# Patient Record
Sex: FEMALE | Race: WHITE | NOT HISPANIC OR LATINO | Employment: FULL TIME | URBAN - METROPOLITAN AREA
[De-identification: names, ages, dates, MRNs, and addresses within clinical notes are randomized per-mention and may not be internally consistent; named-entity substitution may affect disease eponyms.]

---

## 2019-07-02 ENCOUNTER — HOSPITAL ENCOUNTER (OUTPATIENT)
Dept: MAMMOGRAPHY | Facility: CLINIC | Age: 64
Discharge: HOME | End: 2019-07-02
Attending: FAMILY MEDICINE | Admitting: FAMILY MEDICINE
Payer: COMMERCIAL

## 2019-07-02 DIAGNOSIS — Z12.31 VISIT FOR SCREENING MAMMOGRAM: ICD-10-CM

## 2019-07-02 PROCEDURE — 77063 BREAST TOMOSYNTHESIS BI: CPT

## 2022-08-30 ENCOUNTER — MEDICAL CORRESPONDENCE (OUTPATIENT)
Dept: HEALTH INFORMATION MANAGEMENT | Facility: CLINIC | Age: 67
End: 2022-08-30

## 2022-08-30 DIAGNOSIS — E78.5 HYPERLIPEMIA: Primary | ICD-10-CM

## 2022-09-06 ENCOUNTER — HOSPITAL ENCOUNTER (OUTPATIENT)
Dept: CARDIOLOGY | Facility: CLINIC | Age: 67
Discharge: HOME | End: 2022-09-06
Attending: FAMILY MEDICINE | Admitting: FAMILY MEDICINE

## 2022-09-06 DIAGNOSIS — E78.5 HYPERLIPEMIA: ICD-10-CM

## 2022-09-06 PROCEDURE — 75571 CT HRT W/O DYE W/CA TEST: CPT

## 2022-09-06 PROCEDURE — 75571 CT HRT W/O DYE W/CA TEST: CPT | Mod: 26 | Performed by: INTERNAL MEDICINE

## 2023-11-05 ENCOUNTER — HEALTH MAINTENANCE LETTER (OUTPATIENT)
Age: 68
End: 2023-11-05

## 2024-01-11 ENCOUNTER — MEDICAL CORRESPONDENCE (OUTPATIENT)
Dept: HEALTH INFORMATION MANAGEMENT | Facility: CLINIC | Age: 69
End: 2024-01-11
Payer: COMMERCIAL

## 2024-01-11 DIAGNOSIS — R07.89 CHEST DISCOMFORT: ICD-10-CM

## 2024-01-11 DIAGNOSIS — R06.02 SOB (SHORTNESS OF BREATH): Primary | ICD-10-CM

## 2024-01-18 ENCOUNTER — HOSPITAL ENCOUNTER (OUTPATIENT)
Dept: CARDIOLOGY | Facility: CLINIC | Age: 69
Discharge: HOME | End: 2024-01-18
Attending: PHYSICIAN ASSISTANT | Admitting: PHYSICIAN ASSISTANT
Payer: COMMERCIAL

## 2024-01-18 DIAGNOSIS — R07.89 CHEST DISCOMFORT: ICD-10-CM

## 2024-01-18 DIAGNOSIS — R06.02 SOB (SHORTNESS OF BREATH): ICD-10-CM

## 2024-01-18 PROCEDURE — 93018 CV STRESS TEST I&R ONLY: CPT | Performed by: INTERNAL MEDICINE

## 2024-01-18 PROCEDURE — 93321 DOPPLER ECHO F-UP/LMTD STD: CPT | Mod: 26 | Performed by: INTERNAL MEDICINE

## 2024-01-18 PROCEDURE — 93325 DOPPLER ECHO COLOR FLOW MAPG: CPT | Mod: TC

## 2024-01-18 PROCEDURE — 255N000002 HC RX 255 OP 636: Performed by: PHYSICIAN ASSISTANT

## 2024-01-18 PROCEDURE — 93350 STRESS TTE ONLY: CPT | Mod: 26 | Performed by: INTERNAL MEDICINE

## 2024-01-18 PROCEDURE — 93016 CV STRESS TEST SUPVJ ONLY: CPT | Performed by: INTERNAL MEDICINE

## 2024-01-18 PROCEDURE — 93325 DOPPLER ECHO COLOR FLOW MAPG: CPT | Mod: 26 | Performed by: INTERNAL MEDICINE

## 2024-01-18 RX ADMIN — HUMAN ALBUMIN MICROSPHERES AND PERFLUTREN 9 ML: 10; .22 INJECTION, SOLUTION INTRAVENOUS at 11:30

## 2024-02-14 ENCOUNTER — OFFICE VISIT (OUTPATIENT)
Dept: CARDIOLOGY | Facility: CLINIC | Age: 69
End: 2024-02-14
Payer: COMMERCIAL

## 2024-02-14 VITALS
BODY MASS INDEX: 19.37 KG/M2 | WEIGHT: 109.3 LBS | HEART RATE: 88 BPM | HEIGHT: 63 IN | DIASTOLIC BLOOD PRESSURE: 78 MMHG | SYSTOLIC BLOOD PRESSURE: 131 MMHG

## 2024-02-14 DIAGNOSIS — I10 ESSENTIAL HYPERTENSION: ICD-10-CM

## 2024-02-14 DIAGNOSIS — R07.9 CHEST PAIN, UNSPECIFIED TYPE: Primary | ICD-10-CM

## 2024-02-14 PROCEDURE — 99204 OFFICE O/P NEW MOD 45 MIN: CPT | Performed by: INTERNAL MEDICINE

## 2024-02-14 RX ORDER — OLMESARTAN MEDOXOMIL 20 MG/1
20 TABLET ORAL DAILY
COMMUNITY
Start: 2024-01-12

## 2024-02-14 NOTE — PROGRESS NOTES
CARDIOLOGY CLINIC CONSULTATION      REASON FOR CONSULT:   Chest pain    PRIMARY CARE PHYSICIAN:  Chloé Cameron        History of Present Illness   Lindsey Caballero is an extremely pleasant 68 year old female here as a new patient to discuss her recent chest pain symptoms.  I also see her mother in clinic.  Patient has a minimal past medical history significant only for hypertension.  She is a never smoker.    She reports that she is under a great deal of stress working full-time and caring for her mother who is in her 90s and still lives alone, and also was diagnosed with cancer in September 2023 and has numerous clinic appointments that she needs to be taken to.  Over the past month or so, she is generally began to feel a difficult to describe sensation primarily in her chest and neck.  She is not sure if this is just related to stress, or if there could be something more problematic going on.  Her blood pressure has also been rising as well.  She was started on olmesartan 20 mg daily and shows me a home blood pressure log with recent blood pressures quite good, averaging around 120/70.  She also has been tried on several anxiety medications but had reactions to these.  Her primary care physician ordered an exercise stress echo, which she had done on 1/18/2024, and this showed no ECG or echo evidence of ischemia, and no significant valvular disease.  In addition, back in September 2022 she had a coronary calcium scan which showed a calcium score of 0.  I do not see any recent lipids in our system or in care everywhere, but patient reports she had this done a couple of years ago.    In addition to the above, I also reviewed her most recent chemistry panel and CBC from 2014.      Assessment & Plan     Difficult to describe chest discomfort: Suspect primarily related to significant life stressors, although cannot exclude obstructive CAD despite above testing  Hypertension, currently well-controlled  Never smoker      It  was a pleasure to meet with Lindsey in clinic today.  We discussed her symptoms, which are difficult for her to characterize and therefore somewhat challenging to definitively diagnose.  She understandably is worried about them.  I explained that the exercise stress echo is somewhat reassuring, but unfortunately does not definitively rule out obstructive coronary disease.  This is generally less likely given her coronary calcium score of 0 in 2022, though we also discussed that this only looks at calcified plaque, and she theoretically could have significant stenosis due to noncalcified plaque (though again this is fairly unlikely).  Given the amount of stress that she is under and the degree of worry that the symptoms are causing her, I think that more definitive evaluation is appropriate.  I recommended a coronary CTA, and she is in agreement with this.  Based on the results of this we can decide if any additional testing is required.  In the meantime, I would continue her current olmesartan dose, which appears to be controlling her blood pressures well at home.        Follow-up: If coronary CTA is unremarkable, no routine cardiology follow-up is required, though we would be happy to see Lindsey back at any point with future questions or concerns.      Naif Figueroa MD  Interventional Cardiology  February 14, 2024        Medications   Current Outpatient Medications   Medication    Cholecalciferol (VITAMIN D3 PO)    ibuprofen (ADVIL,MOTRIN) 600 MG tablet    LEVOTHYROXINE SODIUM PO    olmesartan (BENICAR) 20 MG tablet    Rizatriptan Benzoate (MAXALT PO)    cyclobenzaprine (FLEXERIL) 10 MG tablet    HYDROcodone-acetaminophen 5-325 MG per tablet    methylPREDNISolone (MEDROL DOSEPAK) 4 MG tablet     No current facility-administered medications for this visit.     Allergies   Allergies   Allergen Reactions    Codeine Camsylate     Desvenlafaxine      HOT FLASHES, LIGHTHEADEDNESS    Sertraline      LIGHTHEADEDNESS          Physical Exam       BP: 131/78 Pulse: 88            Vital Signs with Ranges  Pulse:  [88] 88  BP: (131)/(78) 131/78  109 lbs 4.8 oz    Constitutional: Well-appearing, no acute distress  Respiratory: Normal respiratory effort, CTAB  Cardiovascular: RRR, no m/r/g.  JVP < 7 cm H2O.  There is no LE edema.  Normal carotid upstrokes, no carotid bruits.

## 2024-02-14 NOTE — LETTER
2/14/2024    Chloé Cameron MD  [address]  [place] [address]    RE: Lindsey Caballero       Dear Colleague,     I had the pleasure of seeing Lindsey Caballero in the [facility].  CARDIOLOGY CLINIC CONSULTATION      REASON FOR CONSULT:   Chest pain    PRIMARY CARE PHYSICIAN:  Chloé Cameron        History of Present Illness  Lindsey Caballero is an extremely pleasant 68 year old female here as a new patient to discuss her recent chest pain symptoms.  I also see her mother in clinic.  Patient has a minimal past medical history significant only for hypertension.  She is a never smoker.    She reports that she is under a great deal of stress working full-time and caring for her mother who is in her 90s and still lives alone, and also was diagnosed with cancer in September 2023 and has numerous clinic appointments that she needs to be taken to.  Over the past month or so, she is generally began to feel a difficult to describe sensation primarily in her chest and neck.  She is not sure if this is just related to stress, or if there could be something more problematic going on.  Her blood pressure has also been rising as well.  She was started on olmesartan 20 mg daily and shows me a home blood pressure log with recent blood pressures quite good, averaging around 120/70.  She also has been tried on several anxiety medications but had reactions to these.  Her primary care physician ordered an exercise stress echo, which she had done on 1/18/2024, and this showed no ECG or echo evidence of ischemia, and no significant valvular disease.  In addition, back in September 2022 she had a coronary calcium scan which showed a calcium score of 0.  I do not see any recent lipids in our system or in care everywhere, but patient reports she had this done a couple of years ago.    In addition to the above, I also reviewed her most recent chemistry panel and CBC from 2014.      Assessment & Plan    Difficult to describe  chest discomfort: Suspect primarily related to significant life stressors, although cannot exclude obstructive CAD despite above testing  Hypertension, currently well-controlled  Never smoker      It was a pleasure to meet with Lindsey in clinic today.  We discussed her symptoms, which are difficult for her to characterize and therefore somewhat challenging to definitively diagnose.  She understandably is worried about them.  I explained that the exercise stress echo is somewhat reassuring, but unfortunately does not definitively rule out obstructive coronary disease.  This is generally less likely given her coronary calcium score of 0 in 2022, though we also discussed that this only looks at calcified plaque, and she theoretically could have significant stenosis due to noncalcified plaque (though again this is fairly unlikely).  Given the amount of stress that she is under and the degree of worry that the symptoms are causing her, I think that more definitive evaluation is appropriate.  I recommended a coronary CTA, and she is in agreement with this.  Based on the results of this we can decide if any additional testing is required.  In the meantime, I would continue her current olmesartan dose, which appears to be controlling her blood pressures well at home.        Follow-up: If coronary CTA is unremarkable, no routine cardiology follow-up is required, though we would be happy to see Lindsey back at any point with future questions or concerns.      Naif Figueroa MD  Interventional Cardiology  February 14, 2024        Medications  Current Outpatient Medications   Medication    Cholecalciferol (VITAMIN D3 PO)    ibuprofen (ADVIL,MOTRIN) 600 MG tablet    LEVOTHYROXINE SODIUM PO    olmesartan (BENICAR) 20 MG tablet    Rizatriptan Benzoate (MAXALT PO)    cyclobenzaprine (FLEXERIL) 10 MG tablet    HYDROcodone-acetaminophen 5-325 MG per tablet    methylPREDNISolone (MEDROL DOSEPAK) 4 MG tablet     No current  facility-administered medications for this visit.     Allergies  Allergies   Allergen Reactions    Codeine Camsylate     Desvenlafaxine      HOT FLASHES, LIGHTHEADEDNESS    Sertraline      LIGHTHEADEDNESS         Physical Exam      BP: 131/78 Pulse: 88            Vital Signs with Ranges  Pulse:  [88] 88  BP: (131)/(78) 131/78  109 lbs 4.8 oz    Constitutional: Well-appearing, no acute distress  Respiratory: Normal respiratory effort, CTAB  Cardiovascular: RRR, no m/r/g.  JVP < 7 cm H2O.  There is no LE edema.  Normal carotid upstrokes, no carotid bruits.      Thank you for allowing me to participate in the care of your patient.      Sincerely,     Naif Figueroa MD     [facility]  cc:   Referred Self,

## 2024-02-26 ENCOUNTER — HOSPITAL ENCOUNTER (OUTPATIENT)
Dept: CARDIOLOGY | Facility: CLINIC | Age: 69
Discharge: HOME | End: 2024-02-26
Attending: INTERNAL MEDICINE | Admitting: INTERNAL MEDICINE
Payer: COMMERCIAL

## 2024-02-26 DIAGNOSIS — R07.9 CHEST PAIN, UNSPECIFIED TYPE: ICD-10-CM

## 2024-02-26 PROCEDURE — 75574 CT ANGIO HRT W/3D IMAGE: CPT

## 2024-02-26 PROCEDURE — 250N000009 HC RX 250: Performed by: INTERNAL MEDICINE

## 2024-02-26 PROCEDURE — 75574 CT ANGIO HRT W/3D IMAGE: CPT | Mod: 26 | Performed by: INTERNAL MEDICINE

## 2024-02-26 PROCEDURE — 250N000013 HC RX MED GY IP 250 OP 250 PS 637: Performed by: INTERNAL MEDICINE

## 2024-02-26 PROCEDURE — 250N000011 HC RX IP 250 OP 636: Performed by: INTERNAL MEDICINE

## 2024-02-26 RX ORDER — METOPROLOL TARTRATE 25 MG/1
25-100 TABLET, FILM COATED ORAL
Status: COMPLETED | OUTPATIENT
Start: 2024-02-26 | End: 2024-02-26

## 2024-02-26 RX ORDER — ACYCLOVIR 200 MG/1
0-1 CAPSULE ORAL
Status: DISCONTINUED | OUTPATIENT
Start: 2024-02-26 | End: 2024-02-27 | Stop reason: HOSPADM

## 2024-02-26 RX ORDER — METOPROLOL TARTRATE 1 MG/ML
5-15 INJECTION, SOLUTION INTRAVENOUS
Status: DISCONTINUED | OUTPATIENT
Start: 2024-02-26 | End: 2024-02-27 | Stop reason: HOSPADM

## 2024-02-26 RX ORDER — IOPAMIDOL 755 MG/ML
500 INJECTION, SOLUTION INTRAVASCULAR ONCE
Status: COMPLETED | OUTPATIENT
Start: 2024-02-26 | End: 2024-02-26

## 2024-02-26 RX ORDER — ONDANSETRON 2 MG/ML
4 INJECTION INTRAMUSCULAR; INTRAVENOUS
Status: DISCONTINUED | OUTPATIENT
Start: 2024-02-26 | End: 2024-02-27 | Stop reason: HOSPADM

## 2024-02-26 RX ORDER — IVABRADINE 5 MG/1
5-15 TABLET, FILM COATED ORAL
Status: COMPLETED | OUTPATIENT
Start: 2024-02-26 | End: 2024-02-26

## 2024-02-26 RX ORDER — NITROGLYCERIN 0.4 MG/1
0.4 TABLET SUBLINGUAL
Status: DISCONTINUED | OUTPATIENT
Start: 2024-02-26 | End: 2024-02-27 | Stop reason: HOSPADM

## 2024-02-26 RX ORDER — DIPHENHYDRAMINE HCL 25 MG
25 CAPSULE ORAL
Status: DISCONTINUED | OUTPATIENT
Start: 2024-02-26 | End: 2024-02-27 | Stop reason: HOSPADM

## 2024-02-26 RX ORDER — DILTIAZEM HYDROCHLORIDE 5 MG/ML
10-15 INJECTION INTRAVENOUS
Status: DISCONTINUED | OUTPATIENT
Start: 2024-02-26 | End: 2024-02-27 | Stop reason: HOSPADM

## 2024-02-26 RX ORDER — METHYLPREDNISOLONE SODIUM SUCCINATE 125 MG/2ML
125 INJECTION, POWDER, LYOPHILIZED, FOR SOLUTION INTRAMUSCULAR; INTRAVENOUS
Status: DISCONTINUED | OUTPATIENT
Start: 2024-02-26 | End: 2024-02-27 | Stop reason: HOSPADM

## 2024-02-26 RX ORDER — DIPHENHYDRAMINE HYDROCHLORIDE 50 MG/ML
25-50 INJECTION INTRAMUSCULAR; INTRAVENOUS
Status: DISCONTINUED | OUTPATIENT
Start: 2024-02-26 | End: 2024-02-27 | Stop reason: HOSPADM

## 2024-02-26 RX ORDER — DILTIAZEM HCL 60 MG
120 TABLET ORAL
Status: DISCONTINUED | OUTPATIENT
Start: 2024-02-26 | End: 2024-02-27 | Stop reason: HOSPADM

## 2024-02-26 RX ADMIN — METOPROLOL TARTRATE 5 MG: 5 INJECTION INTRAVENOUS at 10:31

## 2024-02-26 RX ADMIN — IOPAMIDOL 100 ML: 755 INJECTION, SOLUTION INTRAVENOUS at 10:49

## 2024-02-26 RX ADMIN — METOPROLOL TARTRATE 100 MG: 50 TABLET, FILM COATED ORAL at 09:01

## 2024-02-26 RX ADMIN — METOPROLOL TARTRATE 5 MG: 5 INJECTION INTRAVENOUS at 10:22

## 2024-02-26 RX ADMIN — IVABRADINE 15 MG: 5 TABLET, FILM COATED ORAL at 09:01

## 2024-02-26 RX ADMIN — NITROGLYCERIN 0.4 MG: 0.4 TABLET SUBLINGUAL at 10:37

## 2024-02-26 RX ADMIN — METOPROLOL TARTRATE 5 MG: 5 INJECTION INTRAVENOUS at 10:30

## 2024-02-26 RX ADMIN — METOPROLOL TARTRATE 5 MG: 5 INJECTION INTRAVENOUS at 10:32

## 2024-06-10 ENCOUNTER — APPOINTMENT (OUTPATIENT)
Dept: CT IMAGING | Facility: CLINIC | Age: 69
End: 2024-06-10
Attending: EMERGENCY MEDICINE
Payer: COMMERCIAL

## 2024-06-10 ENCOUNTER — HOSPITAL ENCOUNTER (EMERGENCY)
Facility: CLINIC | Age: 69
Discharge: HOME | End: 2024-06-10
Attending: EMERGENCY MEDICINE | Admitting: EMERGENCY MEDICINE
Payer: COMMERCIAL

## 2024-06-10 VITALS
WEIGHT: 108 LBS | HEIGHT: 63 IN | HEART RATE: 93 BPM | DIASTOLIC BLOOD PRESSURE: 89 MMHG | RESPIRATION RATE: 12 BRPM | OXYGEN SATURATION: 99 % | SYSTOLIC BLOOD PRESSURE: 148 MMHG | TEMPERATURE: 98.7 F | BODY MASS INDEX: 19.14 KG/M2

## 2024-06-10 DIAGNOSIS — I10 BENIGN ESSENTIAL HYPERTENSION: ICD-10-CM

## 2024-06-10 DIAGNOSIS — R53.83 FATIGUE, UNSPECIFIED TYPE: ICD-10-CM

## 2024-06-10 LAB
ALBUMIN SERPL BCG-MCNC: 4.4 G/DL (ref 3.5–5.2)
ALP SERPL-CCNC: 77 U/L (ref 40–150)
ALT SERPL W P-5'-P-CCNC: 35 U/L (ref 0–50)
ANION GAP SERPL CALCULATED.3IONS-SCNC: 12 MMOL/L (ref 7–15)
AST SERPL W P-5'-P-CCNC: 34 U/L (ref 0–45)
ATRIAL RATE - MUSE: 84 BPM
BASOPHILS # BLD AUTO: 0 10E3/UL (ref 0–0.2)
BASOPHILS NFR BLD AUTO: 1 %
BILIRUB SERPL-MCNC: 0.3 MG/DL
BUN SERPL-MCNC: 13.1 MG/DL (ref 8–23)
CALCIUM SERPL-MCNC: 9.3 MG/DL (ref 8.8–10.2)
CHLORIDE SERPL-SCNC: 95 MMOL/L (ref 98–107)
CREAT SERPL-MCNC: 0.71 MG/DL (ref 0.51–0.95)
DEPRECATED HCO3 PLAS-SCNC: 24 MMOL/L (ref 22–29)
DIASTOLIC BLOOD PRESSURE - MUSE: NORMAL MMHG
EGFRCR SERPLBLD CKD-EPI 2021: >90 ML/MIN/1.73M2
EOSINOPHIL # BLD AUTO: 0 10E3/UL (ref 0–0.7)
EOSINOPHIL NFR BLD AUTO: 1 %
ERYTHROCYTE [DISTWIDTH] IN BLOOD BY AUTOMATED COUNT: 11.9 % (ref 10–15)
GLUCOSE SERPL-MCNC: 105 MG/DL (ref 70–99)
HCT VFR BLD AUTO: 34.5 % (ref 35–47)
HGB BLD-MCNC: 11.8 G/DL (ref 11.7–15.7)
HOLD SPECIMEN: NORMAL
HOLD SPECIMEN: NORMAL
IMM GRANULOCYTES # BLD: 0 10E3/UL
IMM GRANULOCYTES NFR BLD: 0 %
INTERPRETATION ECG - MUSE: NORMAL
LYMPHOCYTES # BLD AUTO: 0.8 10E3/UL (ref 0.8–5.3)
LYMPHOCYTES NFR BLD AUTO: 11 %
MCH RBC QN AUTO: 29.8 PG (ref 26.5–33)
MCHC RBC AUTO-ENTMCNC: 34.2 G/DL (ref 31.5–36.5)
MCV RBC AUTO: 87 FL (ref 78–100)
MONOCYTES # BLD AUTO: 0.5 10E3/UL (ref 0–1.3)
MONOCYTES NFR BLD AUTO: 7 %
NEUTROPHILS # BLD AUTO: 5.8 10E3/UL (ref 1.6–8.3)
NEUTROPHILS NFR BLD AUTO: 80 %
NRBC # BLD AUTO: 0 10E3/UL
NRBC BLD AUTO-RTO: 0 /100
P AXIS - MUSE: 77 DEGREES
PLATELET # BLD AUTO: 267 10E3/UL (ref 150–450)
POTASSIUM SERPL-SCNC: 4.1 MMOL/L (ref 3.4–5.3)
PR INTERVAL - MUSE: 136 MS
PROT SERPL-MCNC: 7.4 G/DL (ref 6.4–8.3)
QRS DURATION - MUSE: 80 MS
QT - MUSE: 336 MS
QTC - MUSE: 397 MS
R AXIS - MUSE: 87 DEGREES
RBC # BLD AUTO: 3.96 10E6/UL (ref 3.8–5.2)
SODIUM SERPL-SCNC: 131 MMOL/L (ref 135–145)
SYSTOLIC BLOOD PRESSURE - MUSE: NORMAL MMHG
T AXIS - MUSE: 64 DEGREES
TROPONIN T SERPL HS-MCNC: 7 NG/L
TSH SERPL DL<=0.005 MIU/L-ACNC: 0.76 UIU/ML (ref 0.3–4.2)
VENTRICULAR RATE- MUSE: 84 BPM
WBC # BLD AUTO: 7.2 10E3/UL (ref 4–11)

## 2024-06-10 PROCEDURE — 84443 ASSAY THYROID STIM HORMONE: CPT | Performed by: EMERGENCY MEDICINE

## 2024-06-10 PROCEDURE — 99285 EMERGENCY DEPT VISIT HI MDM: CPT | Mod: 25

## 2024-06-10 PROCEDURE — 80053 COMPREHEN METABOLIC PANEL: CPT | Performed by: EMERGENCY MEDICINE

## 2024-06-10 PROCEDURE — 250N000013 HC RX MED GY IP 250 OP 250 PS 637: Performed by: EMERGENCY MEDICINE

## 2024-06-10 PROCEDURE — 36415 COLL VENOUS BLD VENIPUNCTURE: CPT | Performed by: EMERGENCY MEDICINE

## 2024-06-10 PROCEDURE — 84484 ASSAY OF TROPONIN QUANT: CPT | Performed by: EMERGENCY MEDICINE

## 2024-06-10 PROCEDURE — 70450 CT HEAD/BRAIN W/O DYE: CPT | Mod: XU

## 2024-06-10 PROCEDURE — 250N000009 HC RX 250: Performed by: EMERGENCY MEDICINE

## 2024-06-10 PROCEDURE — 93005 ELECTROCARDIOGRAM TRACING: CPT

## 2024-06-10 PROCEDURE — 250N000011 HC RX IP 250 OP 636: Performed by: EMERGENCY MEDICINE

## 2024-06-10 PROCEDURE — 70496 CT ANGIOGRAPHY HEAD: CPT

## 2024-06-10 PROCEDURE — 85004 AUTOMATED DIFF WBC COUNT: CPT | Performed by: EMERGENCY MEDICINE

## 2024-06-10 RX ORDER — IOPAMIDOL 755 MG/ML
67 INJECTION, SOLUTION INTRAVASCULAR ONCE
Status: COMPLETED | OUTPATIENT
Start: 2024-06-10 | End: 2024-06-10

## 2024-06-10 RX ORDER — ACETAMINOPHEN 500 MG
1000 TABLET ORAL ONCE
Status: COMPLETED | OUTPATIENT
Start: 2024-06-10 | End: 2024-06-10

## 2024-06-10 RX ADMIN — IOPAMIDOL 67 ML: 755 INJECTION, SOLUTION INTRAVENOUS at 15:54

## 2024-06-10 RX ADMIN — SODIUM CHLORIDE 100 ML: 9 INJECTION, SOLUTION INTRAVENOUS at 15:53

## 2024-06-10 RX ADMIN — ACETAMINOPHEN 1000 MG: 500 TABLET, FILM COATED ORAL at 17:25

## 2024-06-10 ASSESSMENT — ACTIVITIES OF DAILY LIVING (ADL)
ADLS_ACUITY_SCORE: 35

## 2024-06-10 ASSESSMENT — COLUMBIA-SUICIDE SEVERITY RATING SCALE - C-SSRS
1. IN THE PAST MONTH, HAVE YOU WISHED YOU WERE DEAD OR WISHED YOU COULD GO TO SLEEP AND NOT WAKE UP?: NO
6. HAVE YOU EVER DONE ANYTHING, STARTED TO DO ANYTHING, OR PREPARED TO DO ANYTHING TO END YOUR LIFE?: NO
2. HAVE YOU ACTUALLY HAD ANY THOUGHTS OF KILLING YOURSELF IN THE PAST MONTH?: NO

## 2024-06-10 NOTE — ED TRIAGE NOTES
Pt presents to ED for evaluation of dizziness/lightheadedness since this morning while at work. Pt reports high blood pressure readings at work. Of note, patient stopped taking blood pressure meds last week.      Triage Assessment (Adult)       Row Name 06/10/24 1317          Triage Assessment    Airway WDL WDL        Respiratory WDL    Respiratory WDL WDL        Skin Circulation/Temperature WDL    Skin Circulation/Temperature WDL WDL        Cardiac WDL    Cardiac WDL X  HTN and dizziness. denies CP        Peripheral/Neurovascular WDL    Peripheral Neurovascular WDL WDL        Cognitive/Neuro/Behavioral WDL    Cognitive/Neuro/Behavioral WDL WDL

## 2024-06-10 NOTE — ED PROVIDER NOTES
"  Emergency Department Note      History of Present Illness     Chief Complaint  Dizziness    HPI  Lindsey Caballero is a 69 year old female who presents to the ER for evaluation of headache that onset this morning. She initially attributed her symptoms to hypertension. She reports her headache is a 4/10 and describes a \"swarmy\" feeling and denies room-spinning or balance issues. She was experiencing hot and cold spells at work today. She endorses feeling like her blood sugar is low. She notes taking deeper breaths, but attributes it to anxiety. Patient describes waking early around 0500 the last few nights. She states that she stopped taking Olmesartan 6 days ago (6/4/24) without provider knowledge, that she had been prescribed in January. She reports that after she started taking her BP medicine, her BP tappered off to the 120s. She denies chest pain, nausea, vomiting, changes in urination, or changes in bowel movements. Patient notes not sleeping well. Patient describes a history of migraines but states that this headache is unlike any others. She has not taken medication to treat migraines for a while. No recent smoking or alcohol use.     Independent Historian  None    Review of External Notes  I reviewed medical records from: Cardiology office visit on 2/14/2024 for an overview of the patient's previous cardiac workup   Past Medical History   Medical History and Problem List  Anorexia nervosa   Hirsutism   Migraine headache  Sciatica  Migraine   Thyroid disease  Lumbar Radiculopathy    Medications  Cyclobenzaprine   Levothyroxine   Olmesartan   Rizatriptan     Surgical History   Colonoscopy     Physical Exam   Patient Vitals for the past 24 hrs:   BP Temp Temp src Pulse Resp SpO2 Height Weight   06/10/24 1630 (!) 141/77 -- -- 85 20 99 % -- --   06/10/24 1319 (!) 163/72 98.7  F (37.1  C) Oral 91 20 100 % 1.6 m (5' 3\") 49 kg (108 lb)     Physical Exam  Constitutional: Well developed, nontox appearance  Head: " Atraumatic.   Neck:  no stridor  Eyes: no scleral icterus,EOMI  Cardiovascular: RRR, 2+ bilat radial pulses  Pulmonary/Chest: nml resp effort  Ext: Warm, well perfused  Neurological: A&O,  CNII-XII intact, nml finger to nose, 5/5 strength throughout upper and lower ext, symmetric; sensation grossly intact, steady gait  Skin: Skin is warm and dry.   Psychiatric: Somewhat anxious  Nursing note and vitals reviewed  Diagnostics   Lab Results   Labs Ordered and Resulted from Time of ED Arrival to Time of ED Departure   COMPREHENSIVE METABOLIC PANEL - Abnormal       Result Value    Sodium 131 (*)     Potassium 4.1      Carbon Dioxide (CO2) 24      Anion Gap 12      Urea Nitrogen 13.1      Creatinine 0.71      GFR Estimate >90      Calcium 9.3      Chloride 95 (*)     Glucose 105 (*)     Alkaline Phosphatase 77      AST 34      ALT 35      Protein Total 7.4      Albumin 4.4      Bilirubin Total 0.3     CBC WITH PLATELETS AND DIFFERENTIAL - Abnormal    WBC Count 7.2      RBC Count 3.96      Hemoglobin 11.8      Hematocrit 34.5 (*)     MCV 87      MCH 29.8      MCHC 34.2      RDW 11.9      Platelet Count 267      % Neutrophils 80      % Lymphocytes 11      % Monocytes 7      % Eosinophils 1      % Basophils 1      % Immature Granulocytes 0      NRBCs per 100 WBC 0      Absolute Neutrophils 5.8      Absolute Lymphocytes 0.8      Absolute Monocytes 0.5      Absolute Eosinophils 0.0      Absolute Basophils 0.0      Absolute Immature Granulocytes 0.0      Absolute NRBCs 0.0     TROPONIN T, HIGH SENSITIVITY - Normal    Troponin T, High Sensitivity 7     TSH WITH FREE T4 REFLEX - Normal    TSH 0.76         Imaging  CTA Head Neck with Contrast   Final Result   IMPRESSION:    1. No high-grade stenosis or occlusion of the major intracranial   arteries.   2. No intracranial aneurysm or high flow vascular malformation.   3. No significant stenosis/occlusion of the cervical carotid or   vertebral arteries. No definite CT findings of  "dissection.       MONY FERNANDES MD            SYSTEM ID:  [id number]      Head CT w/o contrast   Final Result   IMPRESSION: No CT findings of acute intracranial process.      MONY FERNANDES MD            SYSTEM ID:  [id number]          EKG   ECG taken at 1318, ECG read at 1320  Normal sinus rhythm  Normal ECG   No change as compared to prior, dated 06/10/2024.  Rate 84 bpm. VA interval 136 ms. QRS duration 80 ms. QT/QTc 336/397 ms. P-R-T axes 77 87 64.    Independent Interpretation  EKG normal sinus rhythm without acute ischemic findings, CT head negative for hemorrhage   ED Course    Medications Administered  Medications   acetaminophen (TYLENOL) tablet 1,000 mg (1,000 mg Oral $Given 6/10/24 1725)   iopamidol (ISOVUE-370) solution 67 mL (67 mLs Intravenous $Given 6/10/24 1554)   SalineFlush (100 mLs Intravenous $Given 6/10/24 1553)       Procedures  Procedures     Discussion of Management  None    Social Determinants of Health adding to complexity of care  Age increasing risk for presentation to the emergency department     ED Course  ED Course as of 06/10/24 1742   Mon Sabino 10, 2024   1410 I obtained the history and examined the patient as noted above.    1727 I rechecked and updated the patient.        Medical Decision Making / Diagnosis   CMS Diagnoses: None    MIPS     None    MDM  69 year old female presenting w/ \"swarming\" feeling headache, left-sided headache typical from her previous migraines onset this morning     DDx includes essential hypertension, medication noncompliance, generalized anxiety disorder, intracranial hemorrhage although seemingly less likely given physical exam and severity of pain, mass.  Given atypical headache, imaging performed as noted above and significant for no remarkable abnormality.  Doubt meningitis, encephalitis given history and physical exam.  Labs significant for minimal hyponatremia otherwise unremarkable.  Given reassuring workup, feel the patient is safe for discharge.  " Differential continues to include potential chronic fatigue syndrome or long COVID symptoms.  She is advised to restart her antihypertensives as previously prescribed and follow-up with her PCP for further discussion regarding medication management.  Recommendations given regarding follow up with PCP and return to the emergency department as needed for new or worsening symptoms.  Patient counseled on all results, disposition and diagnosis.  They are understanding and agreeable to plan. Patient discharged in stable condition.      Disposition  The patient was discharged.     ICD-10 Codes:    ICD-10-CM    1. Benign essential hypertension  I10       2. Fatigue, unspecified type  R53.83            Discharge Medications  New Prescriptions    No medications on file       Scribe Disclosure:  Eren BROWN, am serving as a scribe at 5:30 PM on 6/10/2024 to document services personally performed by Kavon Banks MD based on my observations and the provider's statements to me.     Scribe Disclosure:  Keila BROWN, am serving as the scribe  for Eren Mcdonald, the scribe trainee, at 2:13 PM on 6/10/2024 to document services personally performed by Kavon Banks MD based on our observations and the provider's statements to us.         Kavon Banks MD  06/10/24 2548

## 2024-06-10 NOTE — DISCHARGE INSTRUCTIONS
Please resume taking olmesartan as previously prescribed.    Discharge Instructions  Hypertension - High Blood Pressure    During you visit to the Emergency Department, your blood pressure was higher than the recommended blood pressure.  This may be related to stress, pain, medication or other temporary conditions. In these cases, your blood pressure may return to normal on its own. If you have a history of high blood pressure, you may need to have your provider adjust your medications. Sometimes, your high measurement here may indicate that you have developed high blood pressure that will stay high unless it is treated. As a general rule, high blood pressure causes problems over years rather than days, weeks, or months. So, while it is important to treat blood pressure, it is rarely important to treat blood pressure immediately. Occasionally we will begin a medication in the Emergency Department; more often we will recommend close follow-up for medications with a primary doctor/clinic.    Generally, every Emergency Department visit should have a follow-up clinic visit with either a primary or a specialty clinic/provider. Please follow-up as instructed by your emergency provider today.    Return to the Emergency Department if you start to have:  A severe headache.  Chest pain.  Shortness of breath.  Weakness or numbness that affects one part of the body.  Confusion.  Vision changes.  Significant swelling of legs and/or eyes.  A reaction to any medication started in the Emergency Department.    What can I do to help myself?  Avoid alcohol.  Take any blood pressure medicine that you are prescribed.  Get a good night s sleep.  Lower your salt intake.  Exercise.  Lose weight.  Manage stress.  See your doctor regularly    If blood pressure medication was started in the Emergency Department:  The medicine may not have an immediate effect. The body and brain determine what blood pressure you have. The medicine s job is to  retrain the body s  thermostat  to a lower blood pressure.  You will need to follow up with your provider to see how this medicine is working for you.  If you were given a prescription for medicine here today, be sure to read all of the information (including the package insert) that comes with your prescription.  This will include important information about the medicine, its side effects, and any warnings that you need to know about.  The pharmacist who fills the prescription can provide more information and answer questions you may have about the medicine.  If you have questions or concerns that the pharmacist cannot address, please call or return to the Emergency Department.   Remember that you can always come back to the Emergency Department if you are not able to see your regular provider in the amount of time listed above, if you get any new symptoms, or if there is anything that worries you.

## 2024-07-25 ENCOUNTER — TRANSFERRED RECORDS (OUTPATIENT)
Dept: MULTI SPECIALTY CLINIC | Facility: CLINIC | Age: 69
End: 2024-07-25

## 2024-10-16 ENCOUNTER — HOSPITAL ENCOUNTER (OUTPATIENT)
Dept: MAMMOGRAPHY | Facility: CLINIC | Age: 69
Discharge: HOME | End: 2024-10-16
Attending: FAMILY MEDICINE | Admitting: FAMILY MEDICINE
Payer: COMMERCIAL

## 2024-10-16 DIAGNOSIS — Z12.31 VISIT FOR SCREENING MAMMOGRAM: ICD-10-CM

## 2024-10-16 PROCEDURE — 77063 BREAST TOMOSYNTHESIS BI: CPT

## 2024-12-06 ASSESSMENT — ANXIETY QUESTIONNAIRES
6. BECOMING EASILY ANNOYED OR IRRITABLE: NOT AT ALL
7. FEELING AFRAID AS IF SOMETHING AWFUL MIGHT HAPPEN: NOT AT ALL
GAD7 TOTAL SCORE: 6
8. IF YOU CHECKED OFF ANY PROBLEMS, HOW DIFFICULT HAVE THESE MADE IT FOR YOU TO DO YOUR WORK, TAKE CARE OF THINGS AT HOME, OR GET ALONG WITH OTHER PEOPLE?: SOMEWHAT DIFFICULT
GAD7 TOTAL SCORE: 6
5. BEING SO RESTLESS THAT IT IS HARD TO SIT STILL: NOT AT ALL
1. FEELING NERVOUS, ANXIOUS, OR ON EDGE: NEARLY EVERY DAY
2. NOT BEING ABLE TO STOP OR CONTROL WORRYING: SEVERAL DAYS
GAD7 TOTAL SCORE: 6
4. TROUBLE RELAXING: SEVERAL DAYS
7. FEELING AFRAID AS IF SOMETHING AWFUL MIGHT HAPPEN: NOT AT ALL
IF YOU CHECKED OFF ANY PROBLEMS ON THIS QUESTIONNAIRE, HOW DIFFICULT HAVE THESE PROBLEMS MADE IT FOR YOU TO DO YOUR WORK, TAKE CARE OF THINGS AT HOME, OR GET ALONG WITH OTHER PEOPLE: SOMEWHAT DIFFICULT
3. WORRYING TOO MUCH ABOUT DIFFERENT THINGS: SEVERAL DAYS

## 2024-12-10 ENCOUNTER — OFFICE VISIT (OUTPATIENT)
Dept: INTERNAL MEDICINE | Facility: CLINIC | Age: 69
End: 2024-12-10
Payer: COMMERCIAL

## 2024-12-10 VITALS
RESPIRATION RATE: 14 BRPM | DIASTOLIC BLOOD PRESSURE: 70 MMHG | SYSTOLIC BLOOD PRESSURE: 126 MMHG | BODY MASS INDEX: 20.2 KG/M2 | OXYGEN SATURATION: 98 % | HEIGHT: 63 IN | WEIGHT: 114 LBS | HEART RATE: 88 BPM

## 2024-12-10 DIAGNOSIS — F41.9 ANXIETY: Primary | ICD-10-CM

## 2024-12-10 PROBLEM — M54.16 LUMBAR RADICULOPATHY: Status: ACTIVE | Noted: 2017-12-14

## 2024-12-10 PROCEDURE — 99204 OFFICE O/P NEW MOD 45 MIN: CPT | Performed by: INTERNAL MEDICINE

## 2024-12-10 RX ORDER — MIRTAZAPINE 7.5 MG/1
7.5 TABLET, FILM COATED ORAL AT BEDTIME
Qty: 30 TABLET | Refills: 0 | Status: SHIPPED | OUTPATIENT
Start: 2024-12-10

## 2024-12-10 RX ORDER — BUSPIRONE HYDROCHLORIDE 15 MG/1
1 TABLET ORAL
COMMUNITY
Start: 2024-09-30

## 2024-12-10 RX ORDER — RIZATRIPTAN BENZOATE 10 MG/1
TABLET ORAL
COMMUNITY
Start: 2024-08-26

## 2024-12-10 RX ORDER — MIRTAZAPINE 7.5 MG/1
7.5 TABLET, FILM COATED ORAL AT BEDTIME
Qty: 30 TABLET | Refills: 2 | Status: CANCELLED | OUTPATIENT
Start: 2024-12-10

## 2024-12-10 RX ORDER — LEVOTHYROXINE SODIUM 50 UG/1
1 TABLET ORAL
COMMUNITY
Start: 2024-10-16

## 2024-12-10 NOTE — PROGRESS NOTES
"Assessment & Plan   Anxiety  Uncontrolled chronic condition requiring medication management. She reports side effects to SSRIs and SNRIs. She's not sure if Buspar is working. Settled on plan to wean Buspar (weaning plan detailed in AVS and printed off for patient) then START mirtazapine at night. Cautioned re: side effects of sedation and hunger/weight gain. She plans to continue to follow with her PCP at an outside clinic, and I will defer refills/ongoing management to that provider.  - mirtazapine (REMERON) 7.5 MG tablet; Take 1 tablet (7.5 mg) by mouth at bedtime.    Signed Electronically by:  Paul Mason MD, MPH  [facility]  Internal Medicine    Subjective   Lindsey is a 69 year old presenting for the following health issues: Depression  This is the first time I have met Lindsey, who typically gets care at clinics closer to her residence (such as [facility]).    History of Present Illness       Mental Health Follow-up:  Patient presents to follow-up on Anxiety.    Patient's anxiety since last visit has been:  No change  The patient is having other symptoms associated with anxiety.  Any significant life events: grief or loss and health concerns  Patient is feeling anxious or having panic attacks.  Patient has no concerns about alcohol or drug use.   She is taking medications regularly.     She tells me \"I'm hoping to get a second opinion\". Follows with outside PCP. Has tried multiple mental health medications. Wondering what next steps are. Denies depression. Denies SI. Does endorse continued anxiety. Presents with [employer] testing results.        Objective    /70   Pulse 88   Resp 14   Ht 1.6 m (5' 3\")   Wt 51.7 kg (114 lb)   SpO2 98%   BMI 20.19 kg/m    Body mass index is 20.19 kg/m .    Physical Exam   GENERAL: alert and in no distress.  EYES: conjunctivae/corneas clear. EOMs grossly intact  HENT: Facies symmetric.  RESP: No iWOB.  MSK: Moves all four extremities " freely  SKIN: No significant ulcers, lesions, or rashes on the visualized portions of the skin  NEURO: CN II-XII grossly intact.

## 2024-12-10 NOTE — PATIENT INSTRUCTIONS
- WEAN buspirone 7.5mg twice daily (down from 15mg twice daily) for 7 days, then take 7.5mg once daily for 4 days, then STOP.    - On day 12, START mirtazapine once per day (take it before bed as it will likely cause drowsiness/sedation)

## 2024-12-22 ENCOUNTER — HEALTH MAINTENANCE LETTER (OUTPATIENT)
Age: 69
End: 2024-12-22

## 2025-01-30 ENCOUNTER — TELEPHONE (OUTPATIENT)
Dept: INTERNAL MEDICINE | Facility: CLINIC | Age: 70
End: 2025-01-30
Payer: COMMERCIAL

## 2025-01-30 NOTE — TELEPHONE ENCOUNTER
Medication Question or Refill    Contacts       Contact Date/Time Type Contact Phone/Fax    01/30/2025 10:32 AM CST Phone (Incoming) Lindsey Caballero (Self) [phone] (H)            What medication are you calling about (include dose and sig)?: Mirtazapine 7.5 mg ?    Preferred Pharmacy:   [id number] - [place], [place] - [address] [address]  [address] [address]  Phone: [phone] Fax: [phone]      Controlled Substance Agreement on file:   CSA -- Patient Level:    CSA: None found at the patient level.       Who prescribed the medication?: B. Labore    Do you need a refill? Yes    When did you use the medication last? 10:30 pm last evening 1-29-25    Patient offered an appointment? No    Do you have any questions or concerns?  No      Could we send this information to you in [facility] or would you prefer to receive a phone call?:   Patient would prefer a phone call   Okay to leave a detailed message?: Yes at Home number on file [phone] (home)

## 2025-03-18 ENCOUNTER — TELEPHONE (OUTPATIENT)
Dept: CARDIOLOGY | Facility: CLINIC | Age: 70
End: 2025-03-18
Payer: COMMERCIAL

## 2025-03-18 NOTE — TELEPHONE ENCOUNTER
"[facility] Call Center    Phone Message    May a detailed message be left on voicemail: yes     Reason for Call: Symptoms or Concerns     If patient has red-flag symptoms, warm transfer to triage line    Current symptom or concern: Pain feeling pain going from ear area down to chest \"like a pressure\", it goes away after about 10 min but has happened multiple times. Pt recently seen endo and had thyroid medications adjusted so not sure if that. Declined Red Flag, would like to speak to care team for reccomendations    Symptoms have been present for:  1 week(s)    Has patient previously been seen for this? Yes    By : Jovna (chest Pain)    Date: 2/14/24    Are there any new or worsening symptoms? Yes: New onset    Action Taken: Message routed to:  Other: [facility]    Travel Screening: Not Applicable     Date of Service:                                                                     "

## 2025-03-18 NOTE — TELEPHONE ENCOUNTER
Spoke with patient and she stated she has had 2 episodes of this ear to chest pain and is unsure what to think of it. The first episode was a week ago when patient was at home sitting down. Patient stated she feels ear pressure then goes all the way down to her chest and feels like chest tightness. Patient took 2 baby Aspirins and within 10 minutes it went away. Pt felt good the rest of the week, denies SOB, chest pain on exertion or any other symptoms. Today in a work meeting she had her second episode which felt more intense than the first one. Pt started feeling lightheaded and left her meeting and took 3 baby Aspirins. Pt said that one went away in about 10 minutes as well and she has been feeling ok since.     Pt had coronary CTA done 2/2024 with calcium score being 0 and Normal left main, left anterior descending artery and circumflex  Arteries. Trivial proximal right coronary artery disease.    Pt also had normal CTA head/neck in 6/2024.     Discussed how this may not be heart related and to mention it to PCP and patient agreed but wanted it run by Dr. Figueroa just to hear his thoughts. Pt aware Dr. Figueroa is out of the office this week and knows to go to the ER if it worsens, develops chest pain at rest or any other worrisome symptoms. Will route to Dr. Figueroa to review.

## 2025-03-24 NOTE — TELEPHONE ENCOUNTER
Naif Figueroa MD  You; [facility] Heart Team 431 minutes ago (3:46 PM)       Som Rivera,    Not sure what to make of these symptoms exactly, but they seem very unlikely to be cardiac in nature, particularly with her essentially normal coronary arteries on the CCTA.  Hopefully her symptoms have resolved, but if not I would suggest she discuss with her PCP (though obviously usual ER precautions still apply for severe/accelerating symptoms).    Thanks,  Uvaldo       Spoke with patient and she said it has slowly improved as she now had gotten some ear pressure but it didn't radiate to heart. She has been in touch with PCP about it as well.

## (undated) RX ORDER — METOPROLOL TARTRATE 50 MG
TABLET ORAL
Status: DISPENSED
Start: 2024-02-26

## (undated) RX ORDER — IVABRADINE 5 MG/1
TABLET, FILM COATED ORAL
Status: DISPENSED
Start: 2024-02-26

## (undated) RX ORDER — METOPROLOL TARTRATE 1 MG/ML
INJECTION, SOLUTION INTRAVENOUS
Status: DISPENSED
Start: 2024-02-26